# Patient Record
Sex: MALE | Race: ASIAN | URBAN - METROPOLITAN AREA
[De-identification: names, ages, dates, MRNs, and addresses within clinical notes are randomized per-mention and may not be internally consistent; named-entity substitution may affect disease eponyms.]

---

## 2022-10-12 ENCOUNTER — OFFICE VISIT (OUTPATIENT)
Facility: LOCATION | Age: 52
End: 2022-10-12
Payer: COMMERCIAL

## 2022-10-12 DIAGNOSIS — H25.813 COMBINED FORMS OF AGE-RELATED CATARACT, BILATERAL: ICD-10-CM

## 2022-10-12 DIAGNOSIS — H43.13 VITREOUS HEMORRHAGE, BILATERAL: ICD-10-CM

## 2022-10-12 DIAGNOSIS — E11.3523 DIABETES MELLITUS TYPE 2 WITH PROLIFERATIVE DIABETIC RETINOPATHY WITH TRACTION RETINAL DETACHMENT INVOLVING THE MACULA, BILATERAL: Primary | ICD-10-CM

## 2022-10-12 PROCEDURE — 99204 OFFICE O/P NEW MOD 45 MIN: CPT | Performed by: OPTOMETRIST

## 2022-10-12 ASSESSMENT — VISUAL ACUITY: OS: 20/300

## 2022-10-12 ASSESSMENT — INTRAOCULAR PRESSURE
OD: 13
OS: 14

## 2022-10-12 ASSESSMENT — KERATOMETRY
OD: 43.88
OS: 43.75

## 2022-10-12 NOTE — IMPRESSION/PLAN
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 Plan: Refer to [facility] . Patient reports head snot know if Diabetic has not seen PCP. Recommend establishing care with PCP for further treatment and evaluation ASAP as retina shows signs of PDR OU. Due to bilateral PDR w/ traction and poor view recommend evaluation with [facility] in 2-3 days for a possible B-scan and additional tx as deemed necessary by retinal specialists.

## 2022-10-12 NOTE — IMPRESSION/PLAN
Impression: Vitreous hemorrhage, bilateral: H43.13. Plan: There is a vitreous hemorrhage (VH) present. hazy view retinal tear or retinal detachment is unknown. The patient was encouraged to maintain an upright position and elevate his head while sleeping so that some of the blood will settle inferiorly. We will observe for now, and the patient will call if there is any new visual loss, shadows, floaters, flashes of light or pain.

## 2022-10-12 NOTE — IMPRESSION/PLAN
Impression: Combined forms of age-related cataract, bilateral: H25.813. Plan: Cataracts account for the patient's complaints. Patient will be seen by retina first in regards to plan note 1.

## 2022-10-13 ENCOUNTER — OFFICE VISIT (OUTPATIENT)
Dept: URBAN - METROPOLITAN AREA CLINIC 54 | Facility: CLINIC | Age: 52
End: 2022-10-13
Payer: COMMERCIAL

## 2022-10-13 DIAGNOSIS — E11.3512 TYPE 2 DIAB WITH PROLIF DIAB RTNOP WITH MACULAR EDEMA, L EYE: ICD-10-CM

## 2022-10-13 DIAGNOSIS — E11.3521 TYPE 2 DIAB W PROLIF DIAB RTNOP W TRCTN DTCH MACULA, R EYE: Primary | ICD-10-CM

## 2022-10-13 DIAGNOSIS — H25.13 AGE-RELATED NUCLEAR CATARACT, BILATERAL: ICD-10-CM

## 2022-10-13 PROCEDURE — 92134 CPTRZ OPH DX IMG PST SGM RTA: CPT | Performed by: OPHTHALMOLOGY

## 2022-10-13 PROCEDURE — 99204 OFFICE O/P NEW MOD 45 MIN: CPT | Performed by: OPHTHALMOLOGY

## 2022-10-13 PROCEDURE — 67028 INJECTION EYE DRUG: CPT | Performed by: OPHTHALMOLOGY

## 2022-10-13 PROCEDURE — 76512 OPH US DX B-SCAN: CPT | Performed by: OPHTHALMOLOGY

## 2022-10-13 ASSESSMENT — INTRAOCULAR PRESSURE
OS: 10
OD: 10

## 2022-10-13 NOTE — IMPRESSION/PLAN
Impression: Type 2 diab w prolif diab rtnop w trctn [facility] macula, r eye: O94.2822. OCT: 
OD: POOR VIEW, MACULAR TRACTION
OS: POOR VIEW, LOOKS FLAT Plan: Examination reveals a retinal detachment. The diagnosis, natural history, and prognosis of rhegmatogenous retinal detachment, as well as the risks and benefits of intervention were discussed at length. . To reduce the risk of further visual loss, treatment is strongly recommended. The patient was informed of various surgical techniques; altitude precautions with a gas bubble, including the danger of loss of the eye with travel to a higher altitude or flying; and the possible need to update spectacle correction if a scleral buckle is used. The patient understands that the vision usually improves gradually over a period of several months to 1 year, but may not improve to prior levels. The patient elects to proceed with retinal detachment repair. Risk of redetachment, or proliferative vitreoretinopathy, scar formation, macular pucker, hyphema, glaucoma, hypotony, infection, loss of vision, loss of eye, blindness were fully explained to the patient who voices understanding and agreed to proceed with surgery. Rec: 25g PPV, MP, EL, possible gas OD x TRD/VH (combo cat sx) (1hr, non-urgent) Will need PRE OP AVASTIN WITHIN 1 WEEK OF SURGERY

## 2022-10-13 NOTE — IMPRESSION/PLAN
Impression: Type 2 diab with prolif diab rtnop with macular edema, l eye: Z47.5417. Left. Plan:  The diagnosis, natural history, and prognosis of PDR, as well as the risks and benefits of various treatment options including laser panretinal photocoagulation and Avastin, along with the alternatives of observation or participation in a clinical trial, were discussed at length. The patient understands that the goal of treatment is not to improve vision, but to reduce the likelihood of severe vision loss. The patient elects to proceed with Avastin OS. The patient was urged to work closely with their PCP to avoid systemic complications of diabetic disease.

## 2022-10-13 NOTE — IMPRESSION/PLAN
Impression: Vitreous hemorrhage, bilateral: H43.13. B-scan OU OD: partial hyaloid separation with traction and mac involving TRD
OS: partial hyaloid separation mild inferior traction but no TRD Plan: Patient with vitreous hemorrhage secondary to PDR. Hemorrhage has been persistent for months OD and 4 days OS. Reviewed treatment options with patient including observation and surgery. Surgical Risks, Benefits, and Alternatives were discussed. Discussed with surgery the risk of infection, RD, RT, glaucoma, and hemorrhage may all lead to loss of VA or the eye.

## 2022-10-13 NOTE — IMPRESSION/PLAN
Impression: Age-related nuclear cataract, bilateral: H25.13. Bilateral. Plan: Rec combo cat sx with PPV.  Will refer for cat eval

## 2022-11-10 ENCOUNTER — PROCEDURE (OUTPATIENT)
Dept: URBAN - METROPOLITAN AREA CLINIC 54 | Facility: CLINIC | Age: 52
End: 2022-11-10
Payer: COMMERCIAL

## 2022-11-10 DIAGNOSIS — E11.3521 TYPE 2 DIABETES MELLITUS WITH PROLIFERATIVE DIABETIC RETINOPATHY WITH TRACTION RETINAL DETACHMENT INVOLVING THE MACULA, RIGHT EYE: Primary | ICD-10-CM

## 2022-11-10 PROCEDURE — 67028 INJECTION EYE DRUG: CPT | Performed by: OPHTHALMOLOGY

## 2022-11-10 RX ORDER — PREDNISOLONE ACETATE 10 MG/ML
1 % SUSPENSION/ DROPS OPHTHALMIC
Qty: 5 | Refills: 2 | Status: ACTIVE
Start: 2022-11-10

## 2022-11-10 ASSESSMENT — INTRAOCULAR PRESSURE
OS: 16
OD: 17

## 2022-11-22 ENCOUNTER — POST-OPERATIVE VISIT (OUTPATIENT)
Dept: URBAN - METROPOLITAN AREA CLINIC 13 | Facility: CLINIC | Age: 52
End: 2022-11-22
Payer: COMMERCIAL

## 2022-11-22 DIAGNOSIS — E11.3521 TYPE 2 DIABETES MELLITUS WITH PROLIFERATIVE DIABETIC RETINOPATHY WITH TRACTION RETINAL DETACHMENT INVOLVING THE MACULA, RIGHT EYE: Primary | ICD-10-CM

## 2022-11-22 PROCEDURE — 99024 POSTOP FOLLOW-UP VISIT: CPT | Performed by: OPHTHALMOLOGY

## 2022-11-22 ASSESSMENT — INTRAOCULAR PRESSURE
OS: 14
OD: 27

## 2022-11-22 NOTE — IMPRESSION/PLAN
Impression: S/P 25g PPV, MP, EL, possible gas OD x TRD/VH OD - 1 Day. Type 2 diabetes mellitus with proliferative diabetic retinopathy with traction retinal detachment involving the macula, right eye  K06.1441. Plan: PF/Oflox QID OD Borderline IOP- observe for now Sleep on side RTC 1 week POS DFE OD

## 2022-12-01 ENCOUNTER — POST-OPERATIVE VISIT (OUTPATIENT)
Dept: URBAN - METROPOLITAN AREA CLINIC 54 | Facility: CLINIC | Age: 52
End: 2022-12-01
Payer: COMMERCIAL

## 2022-12-01 DIAGNOSIS — E11.3521 TYPE 2 DIAB W PROLIF DIAB RTNOP W TRCTN DTCH MACULA, R EYE: Primary | ICD-10-CM

## 2022-12-01 PROCEDURE — 99024 POSTOP FOLLOW-UP VISIT: CPT | Performed by: OPHTHALMOLOGY

## 2022-12-01 ASSESSMENT — INTRAOCULAR PRESSURE
OS: 17
OD: 23

## 2022-12-01 NOTE — IMPRESSION/PLAN
Impression: S/P 25g PPV, MP, EL, possible gas OD x TRD/VH OD - 10 Days. Type 2 diab w prolif diab rtnop w trctn dtch macula, r eye  [id number]. Plan: Taper PF 3-2-1. Stop Oflox RTC 1 month POS DFE/OCT OU Re-eval Avastin